# Patient Record
Sex: FEMALE | Race: WHITE
[De-identification: names, ages, dates, MRNs, and addresses within clinical notes are randomized per-mention and may not be internally consistent; named-entity substitution may affect disease eponyms.]

---

## 2017-08-07 ENCOUNTER — APPOINTMENT (OUTPATIENT)
Dept: OBGYN | Facility: CLINIC | Age: 52
End: 2017-08-07
Payer: COMMERCIAL

## 2017-08-07 VITALS
WEIGHT: 206 LBS | DIASTOLIC BLOOD PRESSURE: 77 MMHG | BODY MASS INDEX: 31.96 KG/M2 | SYSTOLIC BLOOD PRESSURE: 126 MMHG | HEIGHT: 67.5 IN

## 2017-08-07 LAB
BILIRUB UR QL STRIP: NORMAL
COLLECTION METHOD: NORMAL
GLUCOSE UR-MCNC: NORMAL
HCG UR QL: 0.2 EU/DL
HEMOCCULT SP1 STL QL: NEGATIVE
HGB UR QL STRIP.AUTO: NORMAL
KETONES UR-MCNC: NORMAL
LEUKOCYTE ESTERASE UR QL STRIP: NORMAL
NITRITE UR QL STRIP: NORMAL
PH UR STRIP: 5.5
PROT UR STRIP-MCNC: NORMAL
SP GR UR STRIP: 1.01

## 2017-08-07 PROCEDURE — 81003 URINALYSIS AUTO W/O SCOPE: CPT | Mod: QW

## 2017-08-07 PROCEDURE — 99396 PREV VISIT EST AGE 40-64: CPT

## 2017-08-07 PROCEDURE — 82270 OCCULT BLOOD FECES: CPT

## 2017-08-10 LAB — CYTOLOGY CVX/VAG DOC THIN PREP: NORMAL

## 2017-12-26 ENCOUNTER — RESULT REVIEW (OUTPATIENT)
Age: 52
End: 2017-12-26

## 2017-12-26 LAB — HPV HIGH+LOW RISK DNA PNL CVX: NEGATIVE

## 2018-02-14 ENCOUNTER — OTHER (OUTPATIENT)
Age: 53
End: 2018-02-14

## 2018-05-24 ENCOUNTER — APPOINTMENT (OUTPATIENT)
Dept: OBGYN | Facility: CLINIC | Age: 53
End: 2018-05-24
Payer: COMMERCIAL

## 2018-05-24 VITALS
SYSTOLIC BLOOD PRESSURE: 124 MMHG | DIASTOLIC BLOOD PRESSURE: 76 MMHG | BODY MASS INDEX: 31.02 KG/M2 | WEIGHT: 200 LBS | HEIGHT: 67.5 IN

## 2018-05-24 PROCEDURE — 99214 OFFICE O/P EST MOD 30 MIN: CPT

## 2018-05-24 PROCEDURE — 36415 COLL VENOUS BLD VENIPUNCTURE: CPT

## 2018-05-25 LAB — 25(OH)D3 SERPL-MCNC: 22 NG/ML

## 2018-06-04 ENCOUNTER — TRANSCRIPTION ENCOUNTER (OUTPATIENT)
Age: 53
End: 2018-06-04

## 2018-06-19 ENCOUNTER — TRANSCRIPTION ENCOUNTER (OUTPATIENT)
Age: 53
End: 2018-06-19

## 2018-06-25 ENCOUNTER — ASOB RESULT (OUTPATIENT)
Age: 53
End: 2018-06-25

## 2018-06-25 ENCOUNTER — APPOINTMENT (OUTPATIENT)
Dept: OBGYN | Facility: CLINIC | Age: 53
End: 2018-06-25
Payer: COMMERCIAL

## 2018-06-25 VITALS
BODY MASS INDEX: 31.34 KG/M2 | HEIGHT: 67.5 IN | WEIGHT: 202 LBS | DIASTOLIC BLOOD PRESSURE: 77 MMHG | SYSTOLIC BLOOD PRESSURE: 127 MMHG

## 2018-06-25 PROCEDURE — 58100 BIOPSY OF UTERUS LINING: CPT

## 2018-06-25 PROCEDURE — 76830 TRANSVAGINAL US NON-OB: CPT

## 2018-06-25 PROCEDURE — 99214 OFFICE O/P EST MOD 30 MIN: CPT | Mod: 25

## 2018-06-25 RX ORDER — SULFAMETHOXAZOLE AND TRIMETHOPRIM 800; 160 MG/1; MG/1
800-160 TABLET ORAL
Qty: 14 | Refills: 0 | Status: DISCONTINUED | COMMUNITY
Start: 2018-06-19

## 2018-06-25 RX ORDER — CHOLECALCIFEROL (VITAMIN D3) 50 MCG
50 MCG TABLET ORAL
Refills: 0 | Status: ACTIVE | COMMUNITY

## 2018-06-25 RX ORDER — CALCIUM CITRATE/VITAMIN D3 315MG-6.25
TABLET ORAL
Refills: 0 | Status: ACTIVE | COMMUNITY

## 2018-06-28 ENCOUNTER — RESULT REVIEW (OUTPATIENT)
Age: 53
End: 2018-06-28

## 2018-06-28 LAB — CORE LAB BIOPSY: NORMAL

## 2018-07-09 ENCOUNTER — APPOINTMENT (OUTPATIENT)
Dept: OBGYN | Facility: CLINIC | Age: 53
End: 2018-07-09
Payer: COMMERCIAL

## 2018-07-09 ENCOUNTER — ASOB RESULT (OUTPATIENT)
Age: 53
End: 2018-07-09

## 2018-07-09 PROCEDURE — 76857 US EXAM PELVIC LIMITED: CPT

## 2018-07-09 PROCEDURE — 76830 TRANSVAGINAL US NON-OB: CPT

## 2018-08-16 ENCOUNTER — OTHER (OUTPATIENT)
Age: 53
End: 2018-08-16

## 2018-11-15 ENCOUNTER — APPOINTMENT (OUTPATIENT)
Dept: OBGYN | Facility: CLINIC | Age: 53
End: 2018-11-15
Payer: COMMERCIAL

## 2018-11-15 VITALS
SYSTOLIC BLOOD PRESSURE: 125 MMHG | HEIGHT: 67.5 IN | DIASTOLIC BLOOD PRESSURE: 75 MMHG | WEIGHT: 212 LBS | BODY MASS INDEX: 32.89 KG/M2

## 2018-11-15 LAB
BILIRUB UR QL STRIP: NORMAL
COLLECTION METHOD: NORMAL
GLUCOSE UR-MCNC: NORMAL
HCG UR QL: 0.2 EU/DL
HEMOCCULT SP1 STL QL: NEGATIVE
HGB UR QL STRIP.AUTO: NORMAL
KETONES UR-MCNC: NORMAL
LEUKOCYTE ESTERASE UR QL STRIP: ABNORMAL
NITRITE UR QL STRIP: NORMAL
PH UR STRIP: 7
PROT UR STRIP-MCNC: NORMAL
SP GR UR STRIP: 1.01

## 2018-11-15 PROCEDURE — 36415 COLL VENOUS BLD VENIPUNCTURE: CPT

## 2018-11-15 PROCEDURE — 82270 OCCULT BLOOD FECES: CPT

## 2018-11-15 PROCEDURE — 81003 URINALYSIS AUTO W/O SCOPE: CPT | Mod: QW

## 2018-11-15 PROCEDURE — 99396 PREV VISIT EST AGE 40-64: CPT

## 2018-11-19 LAB — 25(OH)D3 SERPL-MCNC: 26.2 NG/ML

## 2018-11-20 LAB
CYTOLOGY CVX/VAG DOC THIN PREP: NORMAL
HPV HIGH+LOW RISK DNA PNL CVX: NOT DETECTED

## 2018-11-21 DIAGNOSIS — E55.9 VITAMIN D DEFICIENCY, UNSPECIFIED: ICD-10-CM

## 2019-11-25 ENCOUNTER — APPOINTMENT (OUTPATIENT)
Dept: OBGYN | Facility: CLINIC | Age: 54
End: 2019-11-25
Payer: COMMERCIAL

## 2019-11-25 VITALS
DIASTOLIC BLOOD PRESSURE: 74 MMHG | WEIGHT: 190 LBS | BODY MASS INDEX: 29.47 KG/M2 | SYSTOLIC BLOOD PRESSURE: 124 MMHG | HEIGHT: 67.5 IN

## 2019-11-25 DIAGNOSIS — Z87.898 PERSONAL HISTORY OF OTHER SPECIFIED CONDITIONS: ICD-10-CM

## 2019-11-25 DIAGNOSIS — Z87.42 PERSONAL HISTORY OF OTHER DISEASES OF THE FEMALE GENITAL TRACT: ICD-10-CM

## 2019-11-25 LAB
BILIRUB UR QL STRIP: NORMAL
COLLECTION METHOD: NORMAL
GLUCOSE UR-MCNC: NORMAL
HCG UR QL: 0.2 EU/DL
HEMOCCULT SP1 STL QL: NEGATIVE
HGB UR QL STRIP.AUTO: NORMAL
KETONES UR-MCNC: NORMAL
LEUKOCYTE ESTERASE UR QL STRIP: NORMAL
NITRITE UR QL STRIP: NORMAL
PH UR STRIP: 7
PROT UR STRIP-MCNC: NORMAL
SP GR UR STRIP: 1.01

## 2019-11-25 PROCEDURE — 82270 OCCULT BLOOD FECES: CPT

## 2019-11-25 PROCEDURE — 99396 PREV VISIT EST AGE 40-64: CPT

## 2019-11-25 PROCEDURE — 81003 URINALYSIS AUTO W/O SCOPE: CPT | Mod: QW

## 2019-11-25 NOTE — PHYSICAL EXAM
[Awake] : awake [Alert] : alert [No Discharge] : no discharge [Examination Of The Breasts] : a normal appearance [Breast Nipple Inversion Right] : inverted [No Masses] : no breast masses were palpable [Soft] : soft [Obese] : obese [Oriented x3] : oriented to person, place, and time [Normal] : uterus [No Bleeding] : there was no active vaginal bleeding [Uterine Adnexae] : were not tender and not enlarged [Nl Sphincter Tone] : normal sphincter tone [External Hemorrhoid] : an external hemorrhoid [Acute Distress] : no acute distress [Axillary LAD] : no axillary lymphadenopathy [Mass] : no breast mass [Nipple Discharge] : no nipple discharge [Distended] : not distended [Tender] : non tender [Occult Blood] : occult blood test from digital rectal exam was negative [H/Smegaly] : no hepatosplenomegaly

## 2019-11-25 NOTE — HISTORY OF PRESENT ILLNESS
[1 Year Ago] : 1 year ago [Fair] : being in fair health [Weight Concerns] : weight concens [Recent Weight Loss (___ Lbs)] : recent [unfilled] ~Ulbs weight loss [Last Bone Density ___] : Last bone density studies [unfilled] [Last Mammogram ___] : Last Mammogram was [unfilled] [Last Colonoscopy ___] : Last colonoscopy [unfilled] [Last Pap ___] : Last cervical pap smear was [unfilled] [Postmenopausal] : is postmenopausal [Hot Flashes] : hot flashes [Definite:  ___ (Date)] : the last menstrual period was [unfilled] [Regular Cycle Intervals] : periods have been regular [Experiencing Menopausal Sxs] : experiencing menopausal symptoms [Sexually Active] : is sexually active [Monogamous] : is monogamous [Male ___] : [unfilled] male [Healthy Diet] : not having a healthy diet [Regular Exercise] : not exercising regularly [Dyspareunia] : no dyspareunia [Normal Amount/Duration] : was abnormal [Spotting Between  Menses] : no spotting between menses [Menstrual Cramps] : no menstrual cramps [Currently In Menopause] : not currently in menopause

## 2019-11-26 LAB
APPEARANCE: CLEAR
BACTERIA: NEGATIVE
BILIRUBIN URINE: NEGATIVE
BLOOD URINE: NEGATIVE
COLOR: COLORLESS
GLUCOSE QUALITATIVE U: NEGATIVE
HPV HIGH+LOW RISK DNA PNL CVX: NOT DETECTED
HYALINE CASTS: 0 /LPF
KETONES URINE: NEGATIVE
LEUKOCYTE ESTERASE URINE: NEGATIVE
MICROSCOPIC-UA: NORMAL
NITRITE URINE: NEGATIVE
PH URINE: 7
PROTEIN URINE: NEGATIVE
RED BLOOD CELLS URINE: 1 /HPF
SPECIFIC GRAVITY URINE: 1.01
SQUAMOUS EPITHELIAL CELLS: 0 /HPF
UROBILINOGEN URINE: NORMAL
WHITE BLOOD CELLS URINE: 0 /HPF

## 2019-12-02 LAB
BACTERIA UR CULT: NORMAL
CYTOLOGY CVX/VAG DOC THIN PREP: ABNORMAL

## 2021-02-24 ENCOUNTER — APPOINTMENT (OUTPATIENT)
Dept: OBGYN | Facility: CLINIC | Age: 56
End: 2021-02-24
Payer: COMMERCIAL

## 2021-02-24 ENCOUNTER — RESULT CHARGE (OUTPATIENT)
Age: 56
End: 2021-02-24

## 2021-02-24 VITALS — TEMPERATURE: 97.3 F | SYSTOLIC BLOOD PRESSURE: 128 MMHG | HEIGHT: 67 IN | DIASTOLIC BLOOD PRESSURE: 72 MMHG

## 2021-02-24 LAB
BILIRUB UR QL STRIP: NEGATIVE
GLUCOSE UR-MCNC: NEGATIVE
HCG UR QL: 0.2 EU/DL
HEMOCCULT SP1 STL QL: NEGATIVE
HGB UR QL STRIP.AUTO: NEGATIVE
KETONES UR-MCNC: NEGATIVE
LEUKOCYTE ESTERASE UR QL STRIP: NEGATIVE
NITRITE UR QL STRIP: NEGATIVE
PH UR STRIP: 7
PROT UR STRIP-MCNC: NEGATIVE
QUALITY CONTROL: YES
SP GR UR STRIP: 1.01

## 2021-02-24 PROCEDURE — 99396 PREV VISIT EST AGE 40-64: CPT

## 2021-02-24 PROCEDURE — 99072 ADDL SUPL MATRL&STAF TM PHE: CPT

## 2021-02-24 PROCEDURE — 82270 OCCULT BLOOD FECES: CPT

## 2021-02-24 NOTE — HISTORY OF PRESENT ILLNESS
[Patient reported mammogram was normal] : Patient reported mammogram was normal [Patient reported PAP Smear was normal] : Patient reported PAP Smear was normal [Patient reported bone density results were abnormal] : Patient reported bone density results were abnormal [FreeTextEntry1] : HAS BEEN UNEMPLOYED, LEFT OFFICE POSITION DUE TO COVID19 PANDEMIC, WAS UNABLE TO WORK REMOTELY.  LOOKING FOR PART TIME POSITION.\par \par BABYSITTING AND ENJOYING GRANDSON, MARIANA.  BORN DURING PANDEMIC 2020.  \par \par DISCUSSED PRECAUTIONS AGAINST COVID19.  DISCUSSED AND RECOMMENDED VACCINATION.\par HAS HAD 1ST VACCINE, PFIZER, 2/16/2021.\par \par HAD MAMMOGRAM YESTERDAY.\par \par  [Mammogramdate] : 9/2019 [TextBox_19] : [facility] [PapSmeardate] : 11/2019 [TextBox_31] : NEGATIVE HPV [BoneDensityDate] : 2018 [TextBox_37] : MILD OSTEOPENIA, SIGNIFICANT LOSS [Previously active] : previously active [Men] : men [Vaginal] : vaginal [No] : No [FreeTextEntry2] : PATIENT AND  WITH LOW LIBIDO, NOT S/A CURRENTLY, NOT CONCERNED

## 2021-02-26 ENCOUNTER — TRANSCRIPTION ENCOUNTER (OUTPATIENT)
Age: 56
End: 2021-02-26

## 2021-02-26 LAB — HPV HIGH+LOW RISK DNA PNL CVX: NOT DETECTED

## 2021-03-01 LAB — CYTOLOGY CVX/VAG DOC THIN PREP: ABNORMAL

## 2021-06-17 ENCOUNTER — NON-APPOINTMENT (OUTPATIENT)
Age: 56
End: 2021-06-17

## 2021-08-06 ENCOUNTER — TRANSCRIPTION ENCOUNTER (OUTPATIENT)
Age: 56
End: 2021-08-06

## 2023-02-06 ENCOUNTER — APPOINTMENT (OUTPATIENT)
Dept: OBGYN | Facility: CLINIC | Age: 58
End: 2023-02-06

## 2023-02-06 DIAGNOSIS — N64.89 OTHER SPECIFIED DISORDERS OF BREAST: ICD-10-CM

## 2023-02-20 ENCOUNTER — APPOINTMENT (OUTPATIENT)
Dept: OBGYN | Facility: CLINIC | Age: 58
End: 2023-02-20
Payer: COMMERCIAL

## 2023-02-20 VITALS
WEIGHT: 209 LBS | HEIGHT: 67 IN | BODY MASS INDEX: 32.8 KG/M2 | SYSTOLIC BLOOD PRESSURE: 125 MMHG | DIASTOLIC BLOOD PRESSURE: 70 MMHG

## 2023-02-20 DIAGNOSIS — Z12.11 ENCOUNTER FOR SCREENING FOR MALIGNANT NEOPLASM OF COLON: ICD-10-CM

## 2023-02-20 DIAGNOSIS — M85.80 OTHER SPECIFIED DISORDERS OF BONE DENSITY AND STRUCTURE, UNSPECIFIED SITE: ICD-10-CM

## 2023-02-20 PROCEDURE — 99396 PREV VISIT EST AGE 40-64: CPT

## 2023-02-20 NOTE — HISTORY OF PRESENT ILLNESS
[Men] : men [Vaginal] : vaginal [No] : No [TextBox_4] : annual [Mammogramdate] : 7/30/2022 [TextBox_19] : [facility], BR[facility] [PapSmeardate] : 2/24/21 [TextBox_31] : atrophic [TextBox_37] : osteopenia  [BoneDensityDate] : 6/16/21 [ColonoscopyDate] : 2021 [LMPDate] : 2018 [TextBox_6] : 2018 [FreeTextEntry1] : 2018 [Previously active] : previously active [FreeTextEntry2] : ,

## 2023-02-20 NOTE — PHYSICAL EXAM
[Chaperone Present] : A chaperone was present in the examining room during all aspects of the physical examination [Appropriately responsive] : appropriately responsive [Alert] : alert [No Acute Distress] : no acute distress [No Murmurs] : no murmurs [Soft] : soft [Non-tender] : non-tender [Non-distended] : non-distended [No HSM] : No HSM [No Lesions] : no lesions [No Mass] : no mass [Oriented x3] : oriented x3 [Examination Of The Breasts] : a normal appearance [No Masses] : no breast masses were palpable [Labia Minora] : normal [Labia Majora] : normal [Normal] : normal [Uterine Adnexae] : normal [No Tenderness] : no tenderness [Nl Sphincter Tone] : normal sphincter tone [FreeTextEntry1] : IMTIAZ ARTIS [FreeTextEntry9] : GUAIAC NEGATIVE

## 2023-02-23 LAB
CYTOLOGY CVX/VAG DOC THIN PREP: ABNORMAL
HPV HIGH+LOW RISK DNA PNL CVX: NOT DETECTED

## 2023-08-03 ENCOUNTER — APPOINTMENT (OUTPATIENT)
Dept: ORTHOPEDIC SURGERY | Facility: CLINIC | Age: 58
End: 2023-08-03
Payer: COMMERCIAL

## 2023-08-03 DIAGNOSIS — S92.335A NONDISPLACED FRACTURE OF THIRD METATARSAL BONE, LEFT FOOT, INITIAL ENCOUNTER FOR CLOSED FRACTURE: ICD-10-CM

## 2023-08-03 PROCEDURE — 99203 OFFICE O/P NEW LOW 30 MIN: CPT

## 2023-08-03 PROCEDURE — 28470 CLTX METATARSAL FX WO MNP EA: CPT

## 2023-08-03 NOTE — ASSESSMENT
[FreeTextEntry1] : 57yo female with nondisplaced left 3rd metatarsal head fracture seen on foot MRI.  Recommend nonsurgical management at this time.  -cont camboot - wbat LLE -No strenuous activiteis -Rest, ice, compression, elevation, NSAIDs PRN for pain.  -All questions answered -F/u 6 weeks with repeat xrays   The diagnosis was explained in detail. The potential non-surgical and surgical treatments were reviewed. The relative risks and benefits of each option were considered relative to the patients age, activity level, medical history, symptom severity and previously attempted treatments.  The patient was advised to consult with their primary medical provider prior to initiation of any new medications to reduce the risk of adverse effects specific to their long-term home medications and medical history. The risk of gastrointestinal irritation and kidney injury specific to long-term NSAID use was discussed.

## 2023-08-03 NOTE — HISTORY OF PRESENT ILLNESS
[de-identified] : Patient presents today wit left foot pain. Has been in the current CAM walker for 3 weeks at this time and is seeing podiatry. Patient is here for a second opinion. Has MRI at . Patient reports the CAM boot has been impacting her gait and hurtiung her hips at this time.  Patient had possoble suspected stress fx in the 3rd metatarsal in 2002. Patient reports occasional numb/tingling in both feet aswell.    IMPRESSION:  1. Subchondral fracture in the third metatarsal head with bone marrow edema throughout the third metatarsal. 2. Osteoarthrosis in the second third fourth and fifth tarsometatarsal joints and the navicular cuneiform articulation.

## 2023-08-03 NOTE — PHYSICAL EXAM
[Left] : left foot and ankle [Mild] : mild swelling of dorsal foot [2nd] : 2nd [4th] : 4th [3rd] : 3rd [NL (40)] : plantar flexion 40 degrees [NL 30)] : inversion 30 degrees [NL (20)] : eversion 20 degrees [5___] : [facility] 5[unfilled]/5 [2+] : posterior tibialis pulse: 2+ [] : patient ambulates without assistive device [de-identified] : uses camboot

## 2023-09-14 ENCOUNTER — APPOINTMENT (OUTPATIENT)
Dept: ORTHOPEDIC SURGERY | Facility: CLINIC | Age: 58
End: 2023-09-14

## 2024-01-05 ENCOUNTER — NON-APPOINTMENT (OUTPATIENT)
Age: 59
End: 2024-01-05

## 2024-03-19 ENCOUNTER — APPOINTMENT (OUTPATIENT)
Dept: OBGYN | Facility: CLINIC | Age: 59
End: 2024-03-19
Payer: COMMERCIAL

## 2024-03-19 VITALS
HEIGHT: 67 IN | BODY MASS INDEX: 30.76 KG/M2 | DIASTOLIC BLOOD PRESSURE: 70 MMHG | SYSTOLIC BLOOD PRESSURE: 130 MMHG | WEIGHT: 196 LBS

## 2024-03-19 DIAGNOSIS — N60.02 SOLITARY CYST OF LEFT BREAST: ICD-10-CM

## 2024-03-19 DIAGNOSIS — N20.0 CALCULUS OF KIDNEY: ICD-10-CM

## 2024-03-19 DIAGNOSIS — R31.29 OTHER MICROSCOPIC HEMATURIA: ICD-10-CM

## 2024-03-19 DIAGNOSIS — Z01.419 ENCOUNTER FOR GYNECOLOGICAL EXAMINATION (GENERAL) (ROUTINE) W/OUT ABNORMAL FINDINGS: ICD-10-CM

## 2024-03-19 DIAGNOSIS — B97.7 PAPILLOMAVIRUS AS THE CAUSE OF DISEASES CLASSIFIED ELSEWHERE: ICD-10-CM

## 2024-03-19 LAB
DATE COLLECTED: NORMAL
HEMOCCULT SP1 STL QL: NEGATIVE
QUALITY CONTROL: YES

## 2024-03-19 PROCEDURE — 82270 OCCULT BLOOD FECES: CPT

## 2024-03-19 PROCEDURE — 99396 PREV VISIT EST AGE 40-64: CPT

## 2024-03-19 RX ORDER — ROSUVASTATIN CALCIUM 5 MG/1
TABLET, FILM COATED ORAL
Refills: 0 | Status: ACTIVE | COMMUNITY

## 2024-03-19 NOTE — PHYSICAL EXAM
[Chaperone Present] : A chaperone was present in the examining room during all aspects of the physical examination [Appropriately responsive] : appropriately responsive [FreeTextEntry1] : IMTIAZ ARTIS [Alert] : alert [No Acute Distress] : no acute distress [Mass] : mass [Soft] : soft [Non-tender] : non-tender [Non-distended] : non-distended [No HSM] : No HSM [No Lesions] : no lesions [No Mass] : no mass [Oriented x3] : oriented x3 [Examination Of The Breasts] : a normal appearance [FreeTextEntry6] : BREAST NODULE  1 CM SIZE, MOBILE, FIRM, 2 OCLOCL ADJACENT TO AREOLA [No Masses] : no breast masses were palpable [Labia Majora] : normal [Labia Minora] : normal [Normal] : normal [Uterine Adnexae] : non-palpable [No Tenderness] : no tenderness [Nl Sphincter Tone] : normal sphincter tone [FreeTextEntry9] : GUAIAC NEGATIVE

## 2024-03-19 NOTE — HISTORY OF PRESENT ILLNESS
[TextBox_19] : BR 1 [Mammogramdate] : 7/30/22 [TextBox_4] : ANNUAL [PapSmeardate] : 2/10/23 [TextBox_31] : ATROPHIC  [BoneDensityDate] : 6/16/21 [TextBox_37] : STABLE OSTEOPOROSIS  [ColonoscopyDate] : 2021 [LMPDate] : 2018 [FreeTextEntry1] : 2018 [TextBox_6] : 2018 [Previously active] : previously active [Men] : men [Vaginal] : vaginal [No] : No [FreeTextEntry2] : ,

## 2024-03-20 DIAGNOSIS — N64.59 OTHER SIGNS AND SYMPTOMS IN BREAST: ICD-10-CM

## 2024-03-21 LAB — HPV HIGH+LOW RISK DNA PNL CVX: NOT DETECTED

## 2024-03-25 LAB — CYTOLOGY CVX/VAG DOC THIN PREP: ABNORMAL

## 2024-05-22 ENCOUNTER — APPOINTMENT (OUTPATIENT)
Dept: OBGYN | Facility: CLINIC | Age: 59
End: 2024-05-22
Payer: COMMERCIAL

## 2024-05-22 VITALS
WEIGHT: 193 LBS | SYSTOLIC BLOOD PRESSURE: 112 MMHG | HEIGHT: 67 IN | BODY MASS INDEX: 30.29 KG/M2 | DIASTOLIC BLOOD PRESSURE: 72 MMHG

## 2024-05-22 DIAGNOSIS — Z12.39 ENCOUNTER FOR OTHER SCREENING FOR MALIGNANT NEOPLASM OF BREAST: ICD-10-CM

## 2024-05-22 PROCEDURE — 99213 OFFICE O/P EST LOW 20 MIN: CPT

## 2024-06-05 NOTE — HISTORY OF PRESENT ILLNESS
[Patient reported PAP Smear was normal] : Patient reported PAP Smear was normal [N] : Patient reports normal menses [Y] : Positive pregnancy history [Menarche Age: ____] : age at menarche was [unfilled] [TextBox_4] : Na is here for a repeat breast check.  At the time of her annual exam she was found to have a left breast mass. Her mammogram and US did not show any abnormalities. She herself does not feel any changes. [Mammogramdate] : 03/25/2024 [TextBox_19] : limited left breast br1 [BreastSonogramDate] : 03/25/2024 [TextBox_25] : limited left breast br1 [PapSmeardate] : 03/19/2024 [BoneDensityDate] : 06/16/2021 [TextBox_37] : osteoporosis [ColonoscopyDate] : 04/21/2021 [HPVDate] : 03/19/2024 [TextBox_78] : neg [LMPDate] : 2018 [PGHxTotal] : 2 [[facility]] : 2 [[facility] : 2 [FreeTextEntry1] : 2018

## 2024-06-05 NOTE — PHYSICAL EXAM
[Appropriately responsive] : appropriately responsive [Alert] : alert [No Acute Distress] : no acute distress [Oriented x3] : oriented x3 [Examination Of The Breasts] : a normal appearance [Normal] : normal [Breast Nipple Inversion Right] : inverted [No Masses] : no breast masses were palpable [FreeTextEntry6] : no masses appreciated, particularly in the left breast at the area of concern

## 2024-06-05 NOTE — PLAN
[FreeTextEntry1] : normal breast exam today. Rx for yearly mammo given for next year. encouraged self breast awareness/checks.  pt had brca testing- negative.  Mother dx with ov ca at 52yo.

## 2025-04-12 ENCOUNTER — APPOINTMENT (OUTPATIENT)
Dept: OBGYN | Facility: CLINIC | Age: 60
End: 2025-04-12
Payer: COMMERCIAL

## 2025-04-12 VITALS
SYSTOLIC BLOOD PRESSURE: 120 MMHG | HEIGHT: 67 IN | DIASTOLIC BLOOD PRESSURE: 84 MMHG | WEIGHT: 207 LBS | BODY MASS INDEX: 32.49 KG/M2

## 2025-04-12 DIAGNOSIS — Z12.39 ENCOUNTER FOR OTHER SCREENING FOR MALIGNANT NEOPLASM OF BREAST: ICD-10-CM

## 2025-04-12 DIAGNOSIS — Z01.419 ENCOUNTER FOR GYNECOLOGICAL EXAMINATION (GENERAL) (ROUTINE) W/OUT ABNORMAL FINDINGS: ICD-10-CM

## 2025-04-12 DIAGNOSIS — Z12.73 ENCOUNTER FOR SCREENING FOR MALIGNANT NEOPLASM OF OVARY: ICD-10-CM

## 2025-04-12 DIAGNOSIS — Z13.820 ENCOUNTER FOR SCREENING FOR OSTEOPOROSIS: ICD-10-CM

## 2025-04-12 DIAGNOSIS — R92.30 DENSE BREASTS, UNSPECIFIED: ICD-10-CM

## 2025-04-12 PROCEDURE — 99459 PELVIC EXAMINATION: CPT

## 2025-04-12 PROCEDURE — 99396 PREV VISIT EST AGE 40-64: CPT

## 2025-04-12 RX ORDER — MULTIVITAMIN
TABLET ORAL
Refills: 0 | Status: ACTIVE | COMMUNITY

## 2025-07-29 ENCOUNTER — NON-APPOINTMENT (OUTPATIENT)
Age: 60
End: 2025-07-29

## 2025-08-02 ENCOUNTER — NON-APPOINTMENT (OUTPATIENT)
Age: 60
End: 2025-08-02

## 2025-08-06 DIAGNOSIS — N83.209 UNSPECIFIED OVARIAN CYST, UNSPECIFIED SIDE: ICD-10-CM

## 2025-08-14 ENCOUNTER — TRANSCRIPTION ENCOUNTER (OUTPATIENT)
Age: 60
End: 2025-08-14